# Patient Record
Sex: FEMALE | Race: WHITE | HISPANIC OR LATINO | Employment: UNEMPLOYED | URBAN - METROPOLITAN AREA
[De-identification: names, ages, dates, MRNs, and addresses within clinical notes are randomized per-mention and may not be internally consistent; named-entity substitution may affect disease eponyms.]

---

## 2017-01-10 ENCOUNTER — OFFICE VISIT (OUTPATIENT)
Dept: SURGERY | Facility: CLINIC | Age: 22
End: 2017-01-10

## 2017-01-10 VITALS
WEIGHT: 130.06 LBS | HEART RATE: 73 BPM | SYSTOLIC BLOOD PRESSURE: 95 MMHG | DIASTOLIC BLOOD PRESSURE: 54 MMHG | BODY MASS INDEX: 23.79 KG/M2 | TEMPERATURE: 98 F

## 2017-01-10 DIAGNOSIS — K85.10 ACUTE GALLSTONE PANCREATITIS: Primary | ICD-10-CM

## 2017-01-10 DIAGNOSIS — Z98.890 POST-OPERATIVE STATE: ICD-10-CM

## 2017-01-10 PROCEDURE — 99999 PR PBB SHADOW E&M-EST. PATIENT-LVL II: CPT | Mod: PBBFAC,,, | Performed by: PHYSICIAN ASSISTANT

## 2017-01-10 PROCEDURE — 99212 OFFICE O/P EST SF 10 MIN: CPT | Mod: PBBFAC,PO | Performed by: PHYSICIAN ASSISTANT

## 2017-01-10 PROCEDURE — 99024 POSTOP FOLLOW-UP VISIT: CPT | Mod: ,,, | Performed by: PHYSICIAN ASSISTANT

## 2017-01-10 NOTE — PROGRESS NOTES
Chel Michael is status post robotic lap tan and presents today for follow-up care.  She is tolerating a regular diet and denies fevers, nausea or vomiting.    PE: Abdomen is soft, non-tender, non-distended.  Incisions clean, dry, and intact.    Pathology report was reviewed with the patient, which showed Gallbladder with stones:  Cholelithiasis;  Chronic cholecystitis..    A/P:  Normal post-operative course.    The patient is instructed to avoid heavy lifting until 2 weeks after the surgery date.  Return to clinic as needed.